# Patient Record
Sex: FEMALE | Race: WHITE | NOT HISPANIC OR LATINO
[De-identification: names, ages, dates, MRNs, and addresses within clinical notes are randomized per-mention and may not be internally consistent; named-entity substitution may affect disease eponyms.]

---

## 2019-11-25 PROBLEM — Z00.00 ENCOUNTER FOR PREVENTIVE HEALTH EXAMINATION: Status: ACTIVE | Noted: 2019-11-25

## 2019-11-26 ENCOUNTER — APPOINTMENT (OUTPATIENT)
Dept: OTOLARYNGOLOGY | Facility: CLINIC | Age: 29
End: 2019-11-26
Payer: COMMERCIAL

## 2019-11-26 DIAGNOSIS — Z87.09 PERSONAL HISTORY OF OTHER DISEASES OF THE RESPIRATORY SYSTEM: ICD-10-CM

## 2019-11-26 DIAGNOSIS — J32.4 CHRONIC PANSINUSITIS: ICD-10-CM

## 2019-11-26 DIAGNOSIS — J01.10 ACUTE FRONTAL SINUSITIS, UNSPECIFIED: ICD-10-CM

## 2019-11-26 PROCEDURE — 99213 OFFICE O/P EST LOW 20 MIN: CPT | Mod: 25

## 2019-11-26 PROCEDURE — 31231 NASAL ENDOSCOPY DX: CPT

## 2019-11-26 RX ORDER — PREDNISONE 10 MG/1
10 TABLET ORAL
Qty: 12 | Refills: 0 | Status: ACTIVE | COMMUNITY
Start: 2019-11-26 | End: 1900-01-01

## 2019-11-26 NOTE — REVIEW OF SYSTEMS
June 14, 2019      Sierra Irwin MD  [address]  [address]  [facility] [address]           [facility] [id number]  [address] [facility] [address]  [facility] [address]  Phone: [phone]  Fax: [phone]          Patient: Licha Garza   MR Number: [id number]   YOB: 1983   Date of Visit: 6/14/2019       Dear Dr. Sierra Irwin:    Thank you for referring Licha Garza to me for evaluation. Attached you will find relevant portions of my assessment and plan of care.    If you have questions, please do not hesitate to call me. I look forward to following Licha Garza along with you.    Sincerely,    Julie R. Jeansonne, MD    Enclosure  CC:  No Recipients    If you would like to receive this communication electronically, please contact [email or web address] or [phone] to request more information on [email or web address] Link access.    For providers and/or their staff who would like to refer a patient to [facility], please contact us through our one-stop-shop provider referral line, [facility], at [phone].    If you feel you have received this communication in error or would no longer like to receive these types of communications, please e-mail [email or web address]         
[Patient Intake Form Reviewed] : Patient intake form was reviewed

## 2019-11-26 NOTE — PROCEDURE
[FreeTextEntry6] : PROCEDURE NOTES\par \par PROCEDURE: Nasal endoscopy \par SURGEON: Dr. Frost\par INDICATIONS: Assess for chronic sinusitis. \par ANESTHESIA: The patient was placed in a sitting position.  Following application of the topical anesthetic and decongestant, exam was performed with a zero degree endoscope.  The scope was passed along the right nasal floor to the nasopharynx.  It was then passed into the region of the middle meatus, middle turbinate, and sphenoethmoid region.  An identical procedure was performed on the left side.  The following findings were noted:\par \par The nasal mucosa was healthy appearing and the septum was roughly midline. The middle meatus and sphenoethmoid recesses were clear bilaterally. The nasopharynx was normal. \par \par A culture was taken of her right middle meatus.

## 2019-11-26 NOTE — HISTORY OF PRESENT ILLNESS
[de-identified] : She is known to me.\par Have not seen her in some time.\par Her chief complaint today is "sinus infection still lingering, was on Z-Kris for 5 days and still have pressure in head".\par \par She does have a history of recurrent sinus infections.\par She recently developed URI and when she was not improving she was seen by her primary care physician and started on Zithromax.\par She completed the Zithromax 3 days ago.\par While many of the sinus symptoms feel better she continues to have bilateral frontal pressure worsen the right than the left.\par She has a known history of migraine headaches but this headache feels different than her classic migraines.\par She is not having any fever or complaint of mucopus.

## 2019-11-26 NOTE — ASSESSMENT
[FreeTextEntry1] : At this time I do not see any evidence of active infection.\par I will followup her culture.\par I recommend supportive care at this time including 3 days of Afrin as well as Alleve-D.\par Because the holidays he is coming I did give her a backup prescription for a low burst of oral steroid.\par I will see her in followup pending her clinical course.

## 2019-12-05 LAB — BACTERIA FLD CULT: ABNORMAL

## 2020-05-26 ENCOUNTER — TRANSCRIPTION ENCOUNTER (OUTPATIENT)
Age: 30
End: 2020-05-26

## 2020-11-24 ENCOUNTER — TRANSCRIPTION ENCOUNTER (OUTPATIENT)
Age: 30
End: 2020-11-24

## 2021-03-20 ENCOUNTER — TRANSCRIPTION ENCOUNTER (OUTPATIENT)
Age: 31
End: 2021-03-20

## 2021-04-28 ENCOUNTER — APPOINTMENT (OUTPATIENT)
Dept: PULMONOLOGY | Facility: CLINIC | Age: 31
End: 2021-04-28

## 2022-08-04 ENCOUNTER — NON-APPOINTMENT (OUTPATIENT)
Age: 32
End: 2022-08-04

## 2023-07-04 ENCOUNTER — NON-APPOINTMENT (OUTPATIENT)
Age: 33
End: 2023-07-04

## 2024-01-18 ENCOUNTER — NON-APPOINTMENT (OUTPATIENT)
Age: 34
End: 2024-01-18

## 2024-01-25 ENCOUNTER — APPOINTMENT (OUTPATIENT)
Dept: OPHTHALMOLOGY | Facility: CLINIC | Age: 34
End: 2024-01-25
Payer: COMMERCIAL

## 2024-01-25 ENCOUNTER — NON-APPOINTMENT (OUTPATIENT)
Age: 34
End: 2024-01-25

## 2024-01-25 ENCOUNTER — TRANSCRIPTION ENCOUNTER (OUTPATIENT)
Age: 34
End: 2024-01-25

## 2024-01-25 PROCEDURE — 92002 INTRM OPH EXAM NEW PATIENT: CPT

## 2025-07-16 ENCOUNTER — APPOINTMENT (OUTPATIENT)
Dept: CT IMAGING | Facility: CLINIC | Age: 35
End: 2025-07-16
Payer: COMMERCIAL

## 2025-07-16 PROCEDURE — 70486 CT MAXILLOFACIAL W/O DYE: CPT
